# Patient Record
(demographics unavailable — no encounter records)

---

## 2025-01-27 NOTE — HISTORY OF PRESENT ILLNESS
[FreeTextEntry1] : 31 yo female with history of recurrent nephrolithiasis. She underwent right URS for 1.3 cm ureteral stone June 13 2023.   Stone Path: 90% Ca Apatite, 10% CaOx  Her CT scan in May 2023 also showed a left-sided intrarenal stone versus 2 stones measuring 8 and 13 mm.  She recent was seen in the ED for left-sided flank pain and her recent CT imaging shows the stone is much larger and now a left-sided staghorn calculus approximately 3.5 cm in the greatest dimension.  No stones on the right side

## 2025-01-27 NOTE — ASSESSMENT
[FreeTextEntry1] : 29 yo female with recurrent nephrolithiasis with a 3.5 cm left staghorn stone that has significantly increased in size since May 2023. We discussed definitive stone management with left PCNL  The patient will  undergo left PCNL for treatment of her stone. I discussed with the patients risks and benefits and alternatives to percutaneous nephrolithotomy.  The risks include bleeding, risk of transfusion, risk of delayed bleeding requiring angio-embolization, risk of infection and sepsis, risk of injury to the urethra, bladder, ureter or kidney, risk of loss of kidney, risk of injury to adjacent organs including colon, lung, spleen, risk of a staged procedure, risk of stricture, risk of residual fragments, risks of anesthesia including DVT, PE, MI and death.  The patient understands these risks and wishes to proceed with percutaneous nephrolithotomy.  Plan Reviewed CT imaging  Discussed PCNL including risks Schedule for left pcnl No medical clearance needed Urinalysis Urine culture    Patient is being seen today for evaluation and management of a chronic and longitudinal ongoing condition and I am of the primary treating physician.

## 2025-01-27 NOTE — HISTORY OF PRESENT ILLNESS
[FreeTextEntry1] : 29 yo female with history of recurrent nephrolithiasis. She underwent right URS for 1.3 cm ureteral stone June 13 2023.   Stone Path: 90% Ca Apatite, 10% CaOx  Her CT scan in May 2023 also showed a left-sided intrarenal stone versus 2 stones measuring 8 and 13 mm.  She recent was seen in the ED for left-sided flank pain and her recent CT imaging shows the stone is much larger and now a left-sided staghorn calculus approximately 3.5 cm in the greatest dimension.  No stones on the right side

## 2025-01-27 NOTE — ASSESSMENT
[FreeTextEntry1] : 31 yo female with recurrent nephrolithiasis with a 3.5 cm left staghorn stone that has significantly increased in size since May 2023. We discussed definitive stone management with left PCNL  The patient will  undergo left PCNL for treatment of her stone. I discussed with the patients risks and benefits and alternatives to percutaneous nephrolithotomy.  The risks include bleeding, risk of transfusion, risk of delayed bleeding requiring angio-embolization, risk of infection and sepsis, risk of injury to the urethra, bladder, ureter or kidney, risk of loss of kidney, risk of injury to adjacent organs including colon, lung, spleen, risk of a staged procedure, risk of stricture, risk of residual fragments, risks of anesthesia including DVT, PE, MI and death.  The patient understands these risks and wishes to proceed with percutaneous nephrolithotomy.  Plan Reviewed CT imaging  Discussed PCNL including risks Schedule for left pcnl No medical clearance needed Urinalysis Urine culture    Patient is being seen today for evaluation and management of a chronic and longitudinal ongoing condition and I am of the primary treating physician.

## 2025-06-13 NOTE — PLAN
[FreeTextEntry1] : Dx: Missed .  Her condition and treatment options including  1) expectant management  2) Medical management  3) surgical management was discussed.  Through shared decision making, we decided to proceed with surgical management due to history of heavy bleeding requiring transfusion with her last . Procedure was explained to patient. Risks, benefits and expected outcome of the procedure was discussed with the patient. Patient had opportunity to ask questions and all of her questions were answered to her satisfaction. Patient verbalized understanding of the above discussion. Presurgical testing instructions were provided. Booking sheet was submitted for planning  add on case  Bleeding precautions given

## 2025-06-13 NOTE — HISTORY OF PRESENT ILLNESS
[FreeTextEntry1] :  ROSIE RODRIGUEZ ,32 YO,  Presents for Delayed menses.  7w6d by LMP  Patient denies vaginal bleeding or cramping pains.  OBHX: MTOP x 1, STOP x 1, C-sec x 1 GYNHX: Denies PSHx/PMHx: h/o nephrolithiasis s/p cystourethroscopy with lithotripsy  LMP:  25 Regular Mense's Last pap was 2025 Normal Allergies: NKDA  TVUS today shows CRL 5w6d with no fetal heart tone noted  
[FreeTextEntry1] :  ROSIE RODRIGUEZ ,32 YO,  Presents for Delayed menses.  7w6d by LMP  Patient denies vaginal bleeding or cramping pains.  OBHX: MTOP x 1, STOP x 1, C-sec x 1 GYNHX: Denies PSHx/PMHx: h/o nephrolithiasis s/p cystourethroscopy with lithotripsy  LMP:  25 Regular Mense's Last pap was 2025 Normal Allergies: NKDA  TVUS today shows CRL 5w6d with no fetal heart tone noted  
Detail Level: Detailed
Depth Of Biopsy: dermis
Was A Bandage Applied: Yes
Size Of Lesion In Cm: 0
Biopsy Type: H and E
Biopsy Method: Dermablade
Anesthesia Type: 1% lidocaine with epinephrine
Anesthesia Volume In Cc (Will Not Render If 0): 0.5
Hemostasis: Electrocautery
Wound Care: Aquaphor
Dressing: bandage
Destruction After The Procedure: No
Type Of Destruction Used: Curettage
Curettage Text: The wound bed was treated with curettage after the biopsy was performed.
Cryotherapy Text: The wound bed was treated with cryotherapy after the biopsy was performed.
Electrodesiccation Text: The wound bed was treated with electrodesiccation after the biopsy was performed.
Electrodesiccation And Curettage Text: The wound bed was treated with electrodesiccation and curettage after the biopsy was performed.
Silver Nitrate Text: The wound bed was treated with silver nitrate after the biopsy was performed.
Lab: -[id number]
Consent: Written consent was obtained and risks were reviewed including but not limited to scarring, infection, bleeding, scabbing, incomplete removal, nerve damage and allergy to anesthesia.
Post-Care Instructions: I reviewed with the patient in detail post-care instructions. Patient is to keep the biopsy site dry overnight, and then apply THIN layer of Aquaphor once daily with bandage  and change daily until healed.  Patient to call office for any problems ( pain or bleeding or swelling)
Notification Instructions: Patient will be notified of biopsy results. However, patient instructed to call the office if not contacted within 2 weeks.
Billing Type: [employer]
Information: Selecting Yes will display possible errors in your note based on the variables you have selected. This validation is only offered as a suggestion for you. PLEASE NOTE THAT THE VALIDATION TEXT WILL BE REMOVED WHEN YOU FINALIZE YOUR NOTE. IF YOU WANT TO FAX A PRELIMINARY NOTE YOU WILL NEED TO TOGGLE THIS TO 'NO' IF YOU DO NOT WANT IT IN YOUR FAXED NOTE.

## 2025-07-02 NOTE — HISTORY OF PRESENT ILLNESS
[de-identified] : D&C [Pain is well-controlled] : pain is well-controlled [None] : no vaginal bleeding [Pathology reviewed] : pathology reviewed [Fever] : no fever [Chills] : no chills [Nausea] : no nausea [Vomiting] : no vomiting [Diarrhea] : no diarrhea [Vaginal Bleeding] : no vaginal bleeding [Pelvic Pressure] : no pelvic pressure [Dysuria] : no dysuria [Vaginal Discharge] : no vaginal discharge [Constipation] : no constipation [de-identified] : 15 days

## 2025-07-02 NOTE — REASON FOR VISIT
[Post Op Day: ___] : Post-Op Day:  #[unfilled] [Procedure: ___] : Procedure: [unfilled] [Other: _____] : [unfilled] [Indication: ___] : Indication: [unfilled] [de-identified] : 06/17/25